# Patient Record
Sex: FEMALE | Race: ASIAN
[De-identification: names, ages, dates, MRNs, and addresses within clinical notes are randomized per-mention and may not be internally consistent; named-entity substitution may affect disease eponyms.]

---

## 2017-05-12 ENCOUNTER — HOSPITAL ENCOUNTER (EMERGENCY)
Dept: HOSPITAL 25 - UCCORT | Age: 32
Discharge: HOME | End: 2017-05-12
Payer: COMMERCIAL

## 2017-05-12 VITALS — DIASTOLIC BLOOD PRESSURE: 70 MMHG | SYSTOLIC BLOOD PRESSURE: 111 MMHG

## 2017-05-12 DIAGNOSIS — J02.9: Primary | ICD-10-CM

## 2017-05-12 PROCEDURE — G0463 HOSPITAL OUTPT CLINIC VISIT: HCPCS

## 2017-05-12 PROCEDURE — 99212 OFFICE O/P EST SF 10 MIN: CPT

## 2017-05-12 NOTE — UC
Throat Pain/Nasal Alejandro HPI





- HPI Summary


HPI Summary: 





SORE THROAT X 2 DAYS 


+ COUGH , CHEST CONGESTION 


NO FEVER, NO CHILLS 








- History of Current Complaint


Chief Complaint: UCRespiratory


Stated Complaint: SORE THROAT,COUGH,CONGESTION


Time Seen by Provider: 05/12/17 18:11


Hx Obtained From: Patient


Hx Last Menstrual Period: 5/2/17


Onset/Duration: Gradual Onset, Lasting Days - 2, Still Present


Severity: Moderate


Cough: Nonproductive


Associated Signs & Symptoms: Negative: Dysphagia, FB Sensation, Drooling, 

Wheezing, Sinus Discomfort, Nasal Discharge, Fever, Vomiting, Rash





- Allergies/Home Medications


Allergies/Adverse Reactions: 


 Allergies











Allergy/AdvReac Type Severity Reaction Status Date / Time


 


trees Allergy  Runny Nose Uncoded 05/12/17 18:05














PMH/Surg Hx/FS Hx/Imm Hx


Previously Healthy: Yes


Endocrine History Of: 


   Denies: Diabetes


Cardiovascular History Of: 


   Denies: Cardiac Disorders


Respiratory History Of: 


   Denies: Asthma





- Surgical History


Surgical History: Yes


Surgery Procedure, Year, and Place: oral





- Family History


Known Family History: Positive: Cardiac Disease - father MI at age 60, Diabetes 

- mother





- Social History


Alcohol Use: Occasionally


Substance Use Type: None


Smoking Status (MU): Never Smoked Tobacco





Review of Systems


Constitutional: Negative


Skin: Negative


Eyes: Negative


ENT: Sore Throat


Respiratory: Cough


Cardiovascular: Negative


All Other Systems Reviewed And Are Negative: Yes





Physical Exam


Triage Information Reviewed: Yes


Appearance: Well-Appearing, No Pain Distress, Well-Nourished


Vital Signs: 


 Initial Vital Signs











Temp  98.4 F   05/12/17 17:57


 


Pulse  78   05/12/17 17:57


 


Resp  20   05/12/17 17:57


 


BP  111/70   05/12/17 17:57


 


Pulse Ox  100   05/12/17 17:57











Vital Signs Reviewed: Yes


Eyes: Positive: Conjunctiva Clear


ENT: Positive: Normal ENT inspection, Hearing grossly normal, Pharyngeal 

erythema, TMs normal.  Negative: Nasal congestion, Nasal drainage


Neck: Positive: Supple, Nontender, No Lymphadenopathy


Respiratory: Positive: Chest non-tender, Lungs clear, Normal breath sounds


Cardiovascular: Positive: RRR, No Murmur, Pulses Normal





Throat Pain/Nasal Course/Dx





- Differential Dx/Diagnosis


Provider Diagnoses: VIRAL PHARYNGITIS





Discharge





- Discharge Plan


Condition: Stable


Disposition: HOME


Prescriptions: 


Benzonatate CAP* [Tessalon 100 MG CAP*] 100 mg PO TID #21 cap


Patient Education Materials:  Pharyngitis (ED)


Additional Instructions: 


FOLLOW AS NEEDED

## 2018-12-17 ENCOUNTER — HOSPITAL ENCOUNTER (EMERGENCY)
Dept: HOSPITAL 25 - UCCORT | Age: 33
Discharge: LEFT BEFORE DISCHARGE | End: 2018-12-17
Payer: COMMERCIAL

## 2018-12-17 VITALS — DIASTOLIC BLOOD PRESSURE: 63 MMHG | SYSTOLIC BLOOD PRESSURE: 108 MMHG

## 2018-12-17 DIAGNOSIS — R07.9: Primary | ICD-10-CM

## 2018-12-17 DIAGNOSIS — Z91.09: ICD-10-CM

## 2018-12-17 PROCEDURE — G0463 HOSPITAL OUTPT CLINIC VISIT: HCPCS

## 2018-12-17 PROCEDURE — 93005 ELECTROCARDIOGRAM TRACING: CPT

## 2018-12-17 PROCEDURE — 99212 OFFICE O/P EST SF 10 MIN: CPT

## 2018-12-17 NOTE — ED
HPI Chest Pain





- HPI Summary


HPI Summary: 





33 yr old female with the complaint of chest pain.  Onset 2-3 days ago, and the 

pain came on very suddenly, sharp.  She denies SOB.  She denies dizziness.  She 

denies fever, chills.  She denies leg swelling.  The patient is a non smoker.  

She denies a family history of PE, or DVT.  





- History of Current Complaint


Chief Complaint: UCChestPain


Time Seen by Provider: 12/17/18 15:50


Hx Last Menstrual Period: 11/20/18


Pain Intensity: 0





- Allergy/Home Medications


Allergies/Adverse Reactions: 


 Allergies











Allergy/AdvReac Type Severity Reaction Status Date / Time


 


trees Allergy  Runny Nose Uncoded 05/12/17 18:05











Home Medications: 


 Home Medications





Folic Acid/Multivit-Min/Lutein [Multi-Vitamin Gummies] 1 each PO DAILY 12/17/18 

[History Confirmed 12/17/18]











PMH/Surg Hx/FS Hx/Imm Hx


Endocrine/Hematology History: 


   Denies: Hx Diabetes


Respiratory History: 


   Denies: Hx Asthma





- Surgical History


Surgery Procedure, Year, and Place: oral


Infectious Disease History: No


Infectious Disease History: 


   Denies: Hx Hepatitis, Hx of Known/Suspected MRSA, Traveled Outside the [place] in 

Last 30 Days





- Family History


Known Family History: Positive: Cardiac Disease - father MI at age 60, Diabetes 

- mother





- Social History


Occupation: Employed Full-time


Alcohol Use: Rare


Substance Use Type: Reports: None


Smoking Status (MU): Never Smoked Tobacco





Review of Systems


Constitutional: Negative


Positive: Chest Pain


Positive: Shortness Of Breath


All Other Systems Reviewed And Are Negative: Yes





Physical Exam


Triage Information Reviewed: Yes


Vital Signs On Initial Exam: 


 Initial Vitals











Temp Pulse Resp BP Pulse Ox


 


 97.8 F   73   15   108/63   100 


 


 12/17/18 15:44  12/17/18 15:44  12/17/18 15:44  12/17/18 15:44  12/17/18 15:44











Vital Signs Reviewed: Yes


Appearance: Positive: Well-Appearing, No Pain Distress


Skin: Positive: Warm, Skin Color Reflects Adequate Perfusion


Head/Face: Positive: Normal Head/Face Inspection


Eyes: Positive: EOMI


ENT: Positive: Pharynx normal, TMs normal


Neck: Positive: Nontender


Respiratory/Lung Sounds: Positive: Clear to Auscultation, Breath Sounds Present


Cardiovascular: Positive: RRR.  Negative: Murmur


Abdomen Description: Positive: Nontender.  Negative: Distended


Musculoskeletal: Positive: Strength/ROM Intact.  Negative: Edema Left, Edema 

Right


Neurological: Positive: Sensory/Motor Intact, Alert, Oriented to Person Place, 

Time, CN Intact II-III, Normal Gait, Speech Normal


Psychiatric: Positive: Normal





- Hugo Coma Scale


Best Eye Response: 4 - Spontaneous


Best Motor Response: 6 - Obeys Commands


Best Verbal Response: 5 - Oriented


Coma Scale Total: 15





Diagnostics





- Vital Signs


 Vital Signs











  Temp Pulse Resp BP Pulse Ox


 


 12/17/18 15:44  97.8 F  73  15  108/63  100














- Laboratory


Lab Statement: Any lab studies that have been ordered have been reviewed, and 

results considered in the medical decision making process.





- EKG


  ** 12/17/18


Cardiac Rate: NL


EKG Rhythm: Sinus Rhythm


ST Segment: Normal


Ectopy: None


EKG Comparison: No Significant Change





Chest Pain Course/Dx





- Course


Course Of Treatment: 33 yr old female with chest pain etiology not known.  She 

signed out AMA and refused transport to the ER for further evaluation.





- Diagnoses


Provider Diagnoses: 


 Chest pain








Discharge





- Sign-Out/Discharge


Documenting (check all that apply): Patient Departure


All imaging exams completed and their final reports reviewed: No Studies





- Discharge Plan


Condition: Good


Disposition: AGAINST MEDICAL ADVICE


Referrals: 


Leo French MD [Primary Care Provider] - 





- Billing Disposition and Condition


Condition: GOOD


Disposition: Against Medical Advice

## 2019-01-24 ENCOUNTER — HOSPITAL ENCOUNTER (EMERGENCY)
Dept: HOSPITAL 25 - UCCORT | Age: 34
Discharge: HOME | End: 2019-01-24
Payer: COMMERCIAL

## 2019-01-24 VITALS — DIASTOLIC BLOOD PRESSURE: 63 MMHG | SYSTOLIC BLOOD PRESSURE: 105 MMHG

## 2019-01-24 DIAGNOSIS — J06.9: Primary | ICD-10-CM

## 2019-01-24 PROCEDURE — 99212 OFFICE O/P EST SF 10 MIN: CPT

## 2019-01-24 PROCEDURE — G0463 HOSPITAL OUTPT CLINIC VISIT: HCPCS

## 2019-01-24 NOTE — UC
Throat Pain/Nasal Alejandro HPI





- HPI Summary


HPI Summary: 





cough, intermittent sore throat, nasal congestion, and headache started 

yesterday.   is sick contact. nothing makes it worse, otc meds did not 

help.  nothing makes it better.





- History of Current Complaint


Chief Complaint: UCGeneralIllness


Stated Complaint: COLD SYMPTOMS


Time Seen by Provider: 01/24/19 18:43


Hx Obtained From: Patient


Hx Last Menstrual Period: 12/25/19


Pain Intensity: 7


Pain Scale Used: 0-10 Numeric





- Allergies/Home Medications


Allergies/Adverse Reactions: 


 Allergies











Allergy/AdvReac Type Severity Reaction Status Date / Time


 


trees Allergy  Runny Nose Uncoded 01/24/19 18:45











Home Medications: 


 Home Medications





Dm/PE/Acetaminophen/Doxylamine [Daytime-Nighttime Cold-Flu] 2 each PO BID PRN 01 /24/19 [History Confirmed 01/24/19]











PMH/Surg Hx/FS Hx/Imm Hx


Previously Healthy: Yes





- Surgical History


Surgical History: Yes


Surgery Procedure, Year, and Place: oral





- Family History


Known Family History: Positive: Cardiac Disease - father MI at age 60, Diabetes 

- mother





- Social History


Alcohol Use: Occasionally


Substance Use Type: None


Smoking Status (MU): Never Smoked Tobacco





Review of Systems


All Other Systems Reviewed And Are Negative: Yes


Constitutional: Negative: Fever, Chills, Fatigue


Skin: Negative: Rash


ENT: Positive: Sore Throat, Ear Ache, Nasal Discharge, Sinus Congestion.  

Negative: Sinus Pain/Tenderness


Respiratory: Positive: Cough


Cardiovascular: Positive: Negative


Neurological: Negative: Headache





Physical Exam


Triage Information Reviewed: Yes


Appearance: Well-Appearing


Vital Signs: 


 Initial Vital Signs











Temp  97.8 F   01/24/19 18:47


 


Pulse  65   01/24/19 18:47


 


Resp  16   01/24/19 18:47


 


BP  105/63   01/24/19 18:47


 


Pulse Ox  100   01/24/19 18:47











Vital Signs Reviewed: Yes


Eyes: Positive: Conjunctiva Clear


ENT: Positive: Pharynx normal, TMs normal, Uvula midline.  Negative: Sinus 

tenderness


Neck exam: Normal


Neck: Positive: No Lymphadenopathy


Respiratory Exam: Normal


Cardiovascular Exam: Normal


Skin: Negative: Rashes





Throat Pain/Nasal Course/Dx





- Course


Assessment/Plan: Less than one day of URI symptoms.  Vitals and exam 

unremarkable.  otc meds recommended.





- Differential Dx/Diagnosis


Differential Diagnosis/HQI/PQRI: Pharyngitis, URI, Other


Provider Diagnosis: 


 Upper respiratory infection, viral








Discharge





- Sign-Out/Discharge


Documenting (check all that apply): Patient Departure


All imaging exams completed and their final reports reviewed: No Studies





- Discharge Plan


Condition: Good


Disposition: HOME


Prescriptions: 


Dextromethorphan/Benzocaine [Cepacol Sorethroat-Cough Anna Marie] 1 each PO Q4HR PRN #

90 lozenge


 PRN Reason: Congestion


Patient Education Materials:  Upper Respiratory Infection (DC)


Referrals: 


Leo French MD [Primary Care Provider] - 





- Billing Disposition and Condition


Condition: GOOD


Disposition: Home

## 2020-02-04 ENCOUNTER — HOSPITAL ENCOUNTER (EMERGENCY)
Dept: HOSPITAL 25 - UCCORT | Age: 35
Discharge: HOME | End: 2020-02-04
Payer: COMMERCIAL

## 2020-02-04 VITALS — SYSTOLIC BLOOD PRESSURE: 98 MMHG | DIASTOLIC BLOOD PRESSURE: 60 MMHG

## 2020-02-04 DIAGNOSIS — Z91.09: ICD-10-CM

## 2020-02-04 DIAGNOSIS — J02.9: ICD-10-CM

## 2020-02-04 DIAGNOSIS — R19.7: ICD-10-CM

## 2020-02-04 DIAGNOSIS — R07.9: ICD-10-CM

## 2020-02-04 DIAGNOSIS — R51: ICD-10-CM

## 2020-02-04 DIAGNOSIS — B34.9: Primary | ICD-10-CM

## 2020-02-04 PROCEDURE — G0463 HOSPITAL OUTPT CLINIC VISIT: HCPCS

## 2020-02-04 PROCEDURE — 99211 OFF/OP EST MAY X REQ PHY/QHP: CPT

## 2020-02-04 PROCEDURE — 87651 STREP A DNA AMP PROBE: CPT

## 2020-02-04 NOTE — UC
General HPI





- HPI Summary


HPI Summary: 





35 yo Trent maciel, returned from [place] 10 days where she had been 

travelling for 3 weeks. She self quarantined until yesterday. 


Last night she slept for 10 hours, and has had a mild bitemporal headache, mild 

sore throat, and mild inspiratory chest discomfort since this morning. 


Her symptoms have not progressed through the day. 


Typically she is good health and takes no regular medications. She has no hx of 

significant respiratory illness. 


Recently submitted a stool sample to her PMD for sampling as she is concerned 

that she might have a bowel parasite. She passed a thin worm like structure 

rectally several days ago. 


Travel hx was obtained and she did pass through [employer] on 1/22/20, 

where several passengers boarded. Infection control and the health department 

was contacted and guided our actions today. 





- History of Current Complaint


Chief Complaint: UCRespiratory


Stated Complaint: HA,ST,LOW ENERGY


Time Seen by Provider: 02/04/20 16:47


Hx Obtained From: Patient


Hx Last Menstrual Period: 1/23/20


Onset/Duration: Sudden Onset, Lasting Hours


Timing: Constant


Onset Severity: Mild


Current Severity: Moderate


Pain Intensity: 5


Associated Signs & Symptoms: Positive: Chest Pain - mild with deep inspiration, 

Headache - mild bi-temporal.  Negative: Cough, Diaphoresis, Nausea, Syncope, SOB





- Allergy/Home Medications


Allergies/Adverse Reactions: 


 Allergies











Allergy/AdvReac Type Severity Reaction Status Date / Time


 


trees Allergy  Runny Nose Uncoded 01/24/19 18:45











Home Medications: 


 Home Medications





Ascorbic Acid TAB* [Vitamin C  TAB*] 1,000 mg PO DAILY 02/04/20 [History 

Confirmed 02/04/20]


Cholecalciferol (Vitamin D3) [Vitamin D3] 2,000 unit PO DAILY 02/04/20 [History 

Confirmed 02/04/20]


Omega-3 Fatty Acids/Fish Oil [Fish Oil 1,000 mg Softgel] 1 each PO DAILY 02/04/ 20 [History Confirmed 02/04/20]


Vitamin B Complex [Super B-50 Complex] 1 each PO DAILY 02/04/20 [History 

Confirmed 02/04/20]











PMH/Surg Hx/FS Hx/Imm Hx


Previously Healthy: Yes





- Surgical History


Surgical History: Yes


Surgery Procedure, Year, and Place: oral





- Family History


Known Family History: Positive: Cardiac Disease - father MI at age 60, Diabetes 

- mother





- Social History


Alcohol Use: Occasionally


Substance Use Type: None


Smoking Status (MU): Never Smoked Tobacco





Review of Systems


All Other Systems Reviewed And Are Negative: Yes


Constitutional: Positive: Negative


Skin: Positive: Negative


Eyes: Positive: Negative


ENT: Positive: Sore Throat


Respiratory: Positive: Other - mild inspiratory chest pain.


Cardiovascular: Positive: Chest Pain


Gastrointestinal: Positive: Diarrhea - Loose non-bloody stool yesterday, and 

once last week. Several days ago saw a thin worm like thread in her stool which 

she thought might be a parasite from eating sushi. She has hand no weight loss, 

normal appetite, no abdominal pain.


Genitourinary: Positive: Negative


Motor: Positive: Negative


Neurovascular: Positive: Negative


Musculoskeletal: Positive: Negative


Neurological: Positive: Headache


Psychological: Positive: Negative


Is Patient Immunocompromised?: No





Physical Exam


Triage Information Reviewed: Yes


Appearance: Well-Appearing, No Pain Distress, Other: - Examined in room 7 

following donning of PAPR and protective gown and gloves.


Vital Signs: 


 Initial Vital Signs











Temp  97.4 F   02/04/20 14:46


 


Pulse  87   02/04/20 14:46


 


Resp  20   02/04/20 14:46


 


BP  98/60   02/04/20 14:46


 


Pulse Ox  98   02/04/20 14:46











Eye Exam: Normal - ABE, no conjunctival injection, normal eye movement.


ENT: Positive: Pharyngeal erythema - mild posterior erythema., TMs normal, 

Other - Normal mucous membranes.


Dental Exam: Normal


Neck: Positive: Supple, Nontender, No Lymphadenopathy


Respiratory: Positive: Lungs clear, Normal breath sounds, No respiratory 

distress


Cardiovascular: Positive: RRR, No Murmur


Abdomen Description: Positive: Nontender, No Organomegaly, Soft.  Negative: 

Distended, Guarding


Musculoskeletal Exam: Normal


Neurological Exam: Normal


Neurological: Positive: Alert, Muscle Tone Normal


Psychological Exam: Normal


Skin Exam: Normal


Skin: Negative: Rashes - examined head, neck, arms and torso





Course/Dx





- Course


Course Of Treatment: 





Advised isolation and health department will evaluate tomorrow. --see discharge 

instuctions. 





- Diagnoses


Provider Diagnosis: 


 Viral syndrome








Discharge ED





- Sign-Out/Discharge


Documenting (check all that apply): Patient Departure


All imaging exams completed and their final reports reviewed: No Studies





- Discharge Plan


Condition: Stable


Disposition: HOME


Patient Education Materials:  Viral Syndrome (ED)


Forms:  *Work Release


Referrals: 


Leo French MD [Primary Care Provider] - 


Additional Instructions: 


You are being discharged to isolation at home. 


Your present symptoms are mild, and you have a normal exam. Your lung and heart 

sounds are normal, and you do not have concerning findings. However, should 

this be Coronavirus, you could develop a pneumonia. The symptoms of that are 

fever and worsening shortness of breath. 


If you develop fever or shortness of breath, please contact the [facility] ([phone]) or [facility] ([phone]) and advise them that 

you have been assessed here and were advised quarantine due to your symptoms 

and recent travel to [place].  Let them know that you are concerned about 

progressive symptoms and the need to be evaluated again. In that way, they can 

be prepared for your arrival. 


Should you travel to the emergency department, ensure that you wear a mask. 


The Health Department will contact you tomorrow to advise you about further 

testing and how to plan for your 's return home. 


The infectious control person is at [phone] (Joycelyn or Kailey)--Joycelyn is familiar 

with your information. You can also call back here if you have questions ([phone]), until 10 pm. 


Please do not leave your home or allow other persons into your home. 


In the meantime, treat your symptoms with a high intake of fluids, rest, and 

use ibuprofen 600mg up to 4 times per day OR acetaminophen 650 mg up to 4 times 

per day for relief of headache or aches and pains. You can gargle with warm 

water and salt.


Maintain a high intake of fluids and eat healthfully.  





- Billing Disposition and Condition


Condition: STABLE


Disposition: Home